# Patient Record
Sex: FEMALE | Race: WHITE | NOT HISPANIC OR LATINO | Employment: FULL TIME | URBAN - METROPOLITAN AREA
[De-identification: names, ages, dates, MRNs, and addresses within clinical notes are randomized per-mention and may not be internally consistent; named-entity substitution may affect disease eponyms.]

---

## 2018-03-20 ENCOUNTER — APPOINTMENT (OUTPATIENT)
Dept: CT IMAGING | Facility: HOSPITAL | Age: 28
End: 2018-03-20

## 2018-03-20 ENCOUNTER — HOSPITAL ENCOUNTER (EMERGENCY)
Facility: HOSPITAL | Age: 28
Discharge: HOME | End: 2018-03-20
Attending: FAMILY MEDICINE | Admitting: FAMILY MEDICINE

## 2018-03-20 VITALS
TEMPERATURE: 98.7 F | DIASTOLIC BLOOD PRESSURE: 67 MMHG | WEIGHT: 273 LBS | HEART RATE: 68 BPM | RESPIRATION RATE: 18 BRPM | HEIGHT: 70 IN | BODY MASS INDEX: 39.08 KG/M2 | OXYGEN SATURATION: 100 % | SYSTOLIC BLOOD PRESSURE: 118 MMHG

## 2018-03-20 DIAGNOSIS — R55 SYNCOPE AND COLLAPSE: Primary | ICD-10-CM

## 2018-03-20 LAB
ALBUMIN SERPL-MCNC: 4.2 G/DL (ref 3.5–5.2)
ALBUMIN/GLOB SERPL: 1.4 G/DL
ALP SERPL-CCNC: 59 U/L (ref 39–117)
ALT SERPL W P-5'-P-CCNC: 20 U/L (ref 1–33)
ANION GAP SERPL CALCULATED.3IONS-SCNC: 13.2 MMOL/L
AST SERPL-CCNC: 18 U/L (ref 1–32)
BASOPHILS # BLD AUTO: 0.03 10*3/MM3 (ref 0–0.2)
BASOPHILS NFR BLD AUTO: 0.2 % (ref 0–1.5)
BILIRUB SERPL-MCNC: 0.4 MG/DL (ref 0.1–1.2)
BILIRUB UR QL STRIP: NEGATIVE
BUN BLD-MCNC: 7 MG/DL (ref 6–20)
BUN/CREAT SERPL: 9.3 (ref 7–25)
CALCIUM SPEC-SCNC: 9.3 MG/DL (ref 8.6–10.5)
CHLORIDE SERPL-SCNC: 105 MMOL/L (ref 98–107)
CLARITY UR: ABNORMAL
CO2 SERPL-SCNC: 23.8 MMOL/L (ref 22–29)
COLOR UR: YELLOW
CREAT BLD-MCNC: 0.75 MG/DL (ref 0.57–1)
DEPRECATED RDW RBC AUTO: 44.3 FL (ref 37–54)
EOSINOPHIL # BLD AUTO: 0.18 10*3/MM3 (ref 0–0.7)
EOSINOPHIL NFR BLD AUTO: 1.5 % (ref 0.3–6.2)
ERYTHROCYTE [DISTWIDTH] IN BLOOD BY AUTOMATED COUNT: 13.4 % (ref 11.7–13)
GFR SERPL CREATININE-BSD FRML MDRD: 91 ML/MIN/1.73
GLOBULIN UR ELPH-MCNC: 2.9 GM/DL
GLUCOSE BLD-MCNC: 93 MG/DL (ref 65–99)
GLUCOSE UR STRIP-MCNC: NEGATIVE MG/DL
HCT VFR BLD AUTO: 42.8 % (ref 35.6–45.5)
HGB BLD-MCNC: 14.1 G/DL (ref 11.9–15.5)
HGB UR QL STRIP.AUTO: NEGATIVE
HOLD SPECIMEN: NORMAL
HOLD SPECIMEN: NORMAL
IMM GRANULOCYTES # BLD: 0.02 10*3/MM3 (ref 0–0.03)
IMM GRANULOCYTES NFR BLD: 0.2 % (ref 0–0.5)
KETONES UR QL STRIP: ABNORMAL
LEUKOCYTE ESTERASE UR QL STRIP.AUTO: NEGATIVE
LYMPHOCYTES # BLD AUTO: 1.93 10*3/MM3 (ref 0.9–4.8)
LYMPHOCYTES NFR BLD AUTO: 15.9 % (ref 19.6–45.3)
MCH RBC QN AUTO: 29.9 PG (ref 26.9–32)
MCHC RBC AUTO-ENTMCNC: 32.9 G/DL (ref 32.4–36.3)
MCV RBC AUTO: 90.7 FL (ref 80.5–98.2)
MONOCYTES # BLD AUTO: 0.97 10*3/MM3 (ref 0.2–1.2)
MONOCYTES NFR BLD AUTO: 8 % (ref 5–12)
NEUTROPHILS # BLD AUTO: 9.01 10*3/MM3 (ref 1.9–8.1)
NEUTROPHILS NFR BLD AUTO: 74.2 % (ref 42.7–76)
NITRITE UR QL STRIP: NEGATIVE
PH UR STRIP.AUTO: 6 [PH] (ref 5–8)
PLATELET # BLD AUTO: 188 10*3/MM3 (ref 140–500)
PMV BLD AUTO: 12.4 FL (ref 6–12)
POTASSIUM BLD-SCNC: 4 MMOL/L (ref 3.5–5.2)
PROT SERPL-MCNC: 7.1 G/DL (ref 6–8.5)
PROT UR QL STRIP: NEGATIVE
RBC # BLD AUTO: 4.72 10*6/MM3 (ref 3.9–5.2)
SODIUM BLD-SCNC: 142 MMOL/L (ref 136–145)
SP GR UR STRIP: 1.02 (ref 1–1.03)
UROBILINOGEN UR QL STRIP: ABNORMAL
WBC NRBC COR # BLD: 12.14 10*3/MM3 (ref 4.5–10.7)
WHOLE BLOOD HOLD SPECIMEN: NORMAL

## 2018-03-20 PROCEDURE — 80053 COMPREHEN METABOLIC PANEL: CPT | Performed by: FAMILY MEDICINE

## 2018-03-20 PROCEDURE — 93010 ELECTROCARDIOGRAM REPORT: CPT | Performed by: INTERNAL MEDICINE

## 2018-03-20 PROCEDURE — 85025 COMPLETE CBC W/AUTO DIFF WBC: CPT | Performed by: FAMILY MEDICINE

## 2018-03-20 PROCEDURE — 81003 URINALYSIS AUTO W/O SCOPE: CPT | Performed by: FAMILY MEDICINE

## 2018-03-20 PROCEDURE — 36415 COLL VENOUS BLD VENIPUNCTURE: CPT

## 2018-03-20 PROCEDURE — 99284 EMERGENCY DEPT VISIT MOD MDM: CPT

## 2018-03-20 PROCEDURE — 70450 CT HEAD/BRAIN W/O DYE: CPT

## 2018-03-20 PROCEDURE — 25010000002 ONDANSETRON PER 1 MG: Performed by: FAMILY MEDICINE

## 2018-03-20 PROCEDURE — 93005 ELECTROCARDIOGRAM TRACING: CPT | Performed by: FAMILY MEDICINE

## 2018-03-20 PROCEDURE — 93005 ELECTROCARDIOGRAM TRACING: CPT

## 2018-03-20 PROCEDURE — 96374 THER/PROPH/DIAG INJ IV PUSH: CPT

## 2018-03-20 RX ORDER — ESCITALOPRAM OXALATE 20 MG/1
5 TABLET ORAL
COMMUNITY
Start: 2015-08-26

## 2018-03-20 RX ORDER — SODIUM CHLORIDE 0.9 % (FLUSH) 0.9 %
10 SYRINGE (ML) INJECTION AS NEEDED
Status: DISCONTINUED | OUTPATIENT
Start: 2018-03-20 | End: 2018-03-20 | Stop reason: HOSPADM

## 2018-03-20 RX ORDER — NORETHINDRONE ACETATE AND ETHINYL ESTRADIOL 1; .02 MG/1; MG/1
1 TABLET ORAL
COMMUNITY
Start: 2015-08-26

## 2018-03-20 RX ORDER — METRONIDAZOLE 250 MG/1
250 TABLET ORAL 3 TIMES DAILY
COMMUNITY

## 2018-03-20 RX ORDER — ONDANSETRON 2 MG/ML
4 INJECTION INTRAMUSCULAR; INTRAVENOUS ONCE
Status: COMPLETED | OUTPATIENT
Start: 2018-03-20 | End: 2018-03-20

## 2018-03-20 RX ADMIN — SODIUM CHLORIDE 1000 ML: 9 INJECTION, SOLUTION INTRAVENOUS at 15:59

## 2018-03-20 RX ADMIN — ONDANSETRON 4 MG: 2 INJECTION INTRAMUSCULAR; INTRAVENOUS at 15:58

## 2018-03-20 NOTE — ED NOTES
Pt updated. Face appears calm and relaxed. Pt thanked for their patience.      Amber Dasilva RN  03/20/18 7979

## 2018-03-20 NOTE — ED PROVIDER NOTES
EMERGENCY DEPARTMENT ENCOUNTER    CHIEF COMPLAINT  Chief Complaint: Syncope  History given by: Pt  History limited by: Nothing  Room Number: 15/15  PMD: Janice Howell DO      HPI:  Pt is a 27 y.o. female, with history of panic attacks, who presents complaining of an unwitnessed syncopal episode that occurred earlier today.   Pt states that she was having a panic attack with SOB and nausea.  She went to the backroom to vomit. The next thing she remembers was waking up in the bathroom on the floor, with vomit on the toilet, glasses off of her face, and urine on herself.  She states that she was having a stressful phonecall with the phone company prior to the onset of her panic attack. Pt reports that she had one other  Syncopal episode when she was in high school.  Pt denies history of heart issues or a history of seizure.       Duration:  PTA  Onset: Sudden  Timing: Episodic  Intensity/Severity: Moderate  Progression: Improved  Associated Symptoms: Pt states that she was having a panic attack with SOB and nausea.    Aggravating Factors: Unknown   Alleviating Factors: None  Previous Episodes: None  Treatment before arrival: None    PAST MEDICAL HISTORY  Active Ambulatory Problems     Diagnosis Date Noted   • No Active Ambulatory Problems     Resolved Ambulatory Problems     Diagnosis Date Noted   • Pregnancy 2016     Past Medical History:   Diagnosis Date   • Abnormal Pap smear of cervix    • Anxiety    • Depression    • Gonorrhea    • HPV (human papilloma virus) infection        PAST SURGICAL HISTORY  Past Surgical History:   Procedure Laterality Date   •  SECTION     • NASAL SEPTUM SURGERY     • TONSILLECTOMY     • WISDOM TOOTH EXTRACTION         FAMILY HISTORY  History reviewed. No pertinent family history.    SOCIAL HISTORY  Social History     Social History   • Marital status:      Spouse name: N/A   • Number of children: N/A   • Years of education: N/A     Occupational History   • Not on  file.     Social History Main Topics   • Smoking status: Current Every Day Smoker     Packs/day: 1.00     Years: 14.00     Types: Cigarettes   • Smokeless tobacco: Not on file   • Alcohol use No   • Drug use:      Types: Marijuana      Comment: x 8 days ago.   • Sexual activity: Defer     Other Topics Concern   • Not on file     Social History Narrative   • No narrative on file       ALLERGIES  Review of patient's allergies indicates no known allergies.    REVIEW OF SYSTEMS  Review of Systems   Constitutional: Negative for fever.   HENT: Negative for sore throat.    Eyes: Negative.    Respiratory: Positive for shortness of breath. Negative for cough.    Cardiovascular: Negative for chest pain.   Gastrointestinal: Positive for nausea and vomiting. Negative for abdominal pain and diarrhea.   Genitourinary: Negative for dysuria.   Musculoskeletal: Negative for neck pain.   Skin: Negative for rash.   Allergic/Immunologic: Negative.    Neurological: Positive for syncope. Negative for weakness, numbness and headaches.   Hematological: Negative.    Psychiatric/Behavioral: Negative.    All other systems reviewed and are negative.      PHYSICAL EXAM  ED Triage Vitals   Temp Heart Rate Resp BP SpO2   03/20/18 1358 03/20/18 1358 03/20/18 1358 03/20/18 1410 03/20/18 1358   97.8 °F (36.6 °C) 109 18 154/100 100 %      Temp src Heart Rate Source Patient Position BP Location FiO2 (%)   03/20/18 1358 -- 03/20/18 1410 03/20/18 1410 --   Tympanic  Lying Right arm        Physical Exam   Constitutional: She is oriented to person, place, and time and well-developed, well-nourished, and in no distress. No distress.   HENT:   Head: Normocephalic.   There is a tender raised red indu on her forehead.  There is not laceration of the tongue.     Eyes: EOM are normal. Pupils are equal, round, and reactive to light.   Neck: Normal range of motion. Neck supple.   Cardiovascular: Normal rate, regular rhythm and normal heart sounds.     Pulmonary/Chest: Effort normal and breath sounds normal. No respiratory distress.   Abdominal: Soft. There is no tenderness. There is no rebound and no guarding.   Musculoskeletal: Normal range of motion. She exhibits no edema.   Neurological: She is alert and oriented to person, place, and time. She has normal sensation and normal strength.   Skin: Skin is warm and dry. No rash noted.   Psychiatric: Mood and affect normal.   Nursing note and vitals reviewed.      LAB RESULTS  Lab Results (last 24 hours)     Procedure Component Value Units Date/Time    Comprehensive Metabolic Panel [[id number]] Collected:  03/20/18 1431    Specimen:  Blood Updated:  03/20/18 1546     Glucose 93 mg/dL      BUN 7 mg/dL      Creatinine 0.75 mg/dL      Sodium 142 mmol/L      Potassium 4.0 mmol/L      Chloride 105 mmol/L      CO2 23.8 mmol/L      Calcium 9.3 mg/dL      Total Protein 7.1 g/dL      Albumin 4.20 g/dL      ALT (SGPT) 20 U/L      AST (SGOT) 18 U/L      Alkaline Phosphatase 59 U/L      Total Bilirubin 0.4 mg/dL      eGFR Non African Amer 91 mL/min/1.73      Globulin 2.9 gm/dL      A/G Ratio 1.4 g/dL      BUN/Creatinine Ratio 9.3     Anion Gap 13.2 mmol/L     CBC & Differential [[id number]] Collected:  03/20/18 1431    Specimen:  Blood Updated:  03/20/18 1543    Narrative:       The following orders were created for panel order CBC & Differential.  Procedure                               Abnormality         Status                     ---------                               -----------         ------                     CBC Auto Differential[[id number]]        Abnormal            Final result                 Please view results for these tests on the individual orders.    CBC Auto Differential [[id number]]  (Abnormal) Collected:  03/20/18 1431    Specimen:  Blood Updated:  03/20/18 1543     WBC 12.14 (H) 10*3/mm3      RBC 4.72 10*6/mm3      Hemoglobin 14.1 g/dL      Hematocrit 42.8 %      MCV 90.7 fL      MCH 29.9 pg      MCHC  32.9 g/dL      RDW 13.4 (H) %      RDW-SD 44.3 fl      MPV 12.4 (H) fL      Platelets 188 10*3/mm3      Neutrophil % 74.2 %      Lymphocyte % 15.9 (L) %      Monocyte % 8.0 %      Eosinophil % 1.5 %      Basophil % 0.2 %      Immature Grans % 0.2 %      Neutrophils, Absolute 9.01 (H) 10*3/mm3      Lymphocytes, Absolute 1.93 10*3/mm3      Monocytes, Absolute 0.97 10*3/mm3      Eosinophils, Absolute 0.18 10*3/mm3      Basophils, Absolute 0.03 10*3/mm3      Immature Grans, Absolute 0.02 10*3/mm3     Urinalysis With / Microscopic If Indicated - Urine, Clean Catch [[id number]]  (Abnormal) Collected:  03/20/18 1639    Specimen:  Urine from Urine, Clean Catch Updated:  03/20/18 1658     Color, UA Yellow     Appearance, UA Cloudy (A)     pH, UA 6.0     Specific Gravity, UA 1.020     Glucose, UA Negative     Ketones, UA 40 mg/dL (2+) (A)     Bilirubin, UA Negative     Blood, UA Negative     Protein, UA Negative     Leuk Esterase, UA Negative     Nitrite, UA Negative     Urobilinogen, UA 0.2 E.U./dL    Narrative:       Urine microscopic not indicated.          I ordered the above labs and reviewed the results    RADIOLOGY  CT Head Without Contrast   Preliminary Result   No acute process identified. See above. Further evaluation   could be performed with MRI examination of the brain.  The above   information was called to and discussed with Dr. Perez.       Radiation dose reduction techniques were utilized, including automated   exposure control and exposure modulation based on body size.                   I ordered the above noted radiological studies. Interpreted by radiologist.Reviewed by me in PACS.       PROCEDURES  Procedures    EKG           EKG time: 1423  Rhythm/Rate: Sinus, 75   P waves and AK: Normal  QRS, axis: Normal   ST and T waves: Normal     Interpreted Contemporaneously by me, independently viewed  There is no prior for comparison.    PROGRESS AND CONSULTS  ED Course     Medications   sodium chloride 0.9 %  flush 10 mL (not administered)   sodium chloride 0.9 % bolus 1,000 mL (0 mL Intravenous Stopped 3/20/18 1617)   ondansetron (ZOFRAN) injection 4 mg (4 mg Intravenous Given 3/20/18 1558)     1524   Ordered CT head without contrast and urinalysis for further evaluation.     1737  CT is negative. Labs are unremarkable.  Orthostatics are normal. In the context of panic attack, and profound nausea, this seems more like vasovagal syncope. Discussed diagnosis is plan to discharge. Pt understands and agrees with the plan, all questions answered.      MEDICAL DECISION MAKING  Results were reviewed/discussed with the patient and they were also made aware of online access. Pt also made aware that some labs, such as cultures, will not be resulted during ER visit and follow up with PMD is necessary.     MDM  Number of Diagnoses or Management Options  Syncope and collapse:      Amount and/or Complexity of Data Reviewed  Clinical lab tests: reviewed and ordered (Labs show nothing acute)  Tests in the radiology section of CPT®: reviewed and ordered (CT head shows nothing acute)  Tests in the medicine section of CPT®: reviewed and ordered (EKG is unremarkable)    Patient Progress  Patient progress: stable         DIAGNOSIS  Final diagnoses:   Syncope and collapse       DISPOSITION  DISCHARGE    Patient discharged in stable condition.    Reviewed implications of results, diagnosis, meds, responsibility to follow up, warning signs and symptoms of possible worsening, potential complications and reasons to return to ER.    Patient/Family voiced understanding of above instructions.    Discussed plan for discharge, as there is no emergent indication for admission. Patient referred to primary care provider for BP management due to today's BP. Pt/family is agreeable and understands need for follow up and repeat testing.  Pt is aware that discharge does not mean that nothing is wrong but it indicates no emergency is present that requires  admission and they must continue care with follow-up as given below or physician of their choice.     FOLLOW-UP  Janice Howell, DO  [address]  [place] [address] [phone]      For Follow up.  We'd like you to call for an appt to be seen in the next few days.         Medication List      No changes were made to your prescriptions during this visit.           Latest Documented Vital Signs:  As of 5:34 PM  BP- 123/70 HR- 70 Temp- 97.8 °F (36.6 °C) (Tympanic) O2 sat- 100%    --  Documentation assistance provided by ruperto Hart for Dr. Perez.  Information recorded by the scribe was done at my direction and has been verified and validated by me.       Sandrita Hart  03/20/18 1809       Oren Perez MD  03/21/18 0057

## 2018-08-05 ENCOUNTER — OFFICE VISIT (OUTPATIENT)
Dept: RETAIL CLINIC | Facility: CLINIC | Age: 28
End: 2018-08-05

## 2018-08-05 VITALS
RESPIRATION RATE: 18 BRPM | OXYGEN SATURATION: 98 % | HEART RATE: 78 BPM | DIASTOLIC BLOOD PRESSURE: 72 MMHG | SYSTOLIC BLOOD PRESSURE: 118 MMHG | TEMPERATURE: 98.3 F

## 2018-08-05 DIAGNOSIS — H66.009 ACUTE SUPPURATIVE OTITIS MEDIA WITHOUT SPONTANEOUS RUPTURE OF EAR DRUM, RECURRENCE NOT SPECIFIED, UNSPECIFIED LATERALITY: Primary | ICD-10-CM

## 2018-08-05 PROCEDURE — 99213 OFFICE O/P EST LOW 20 MIN: CPT | Performed by: NURSE PRACTITIONER

## 2018-08-05 RX ORDER — AZITHROMYCIN 500 MG/1
500 TABLET, FILM COATED ORAL DAILY
Qty: 5 TABLET | Refills: 0 | Status: SHIPPED | OUTPATIENT
Start: 2018-08-05

## 2018-08-05 RX ORDER — SULFAMETHOXAZOLE AND TRIMETHOPRIM 800; 160 MG/1; MG/1
1 TABLET ORAL 2 TIMES DAILY
COMMUNITY

## 2018-08-05 RX ORDER — BUPROPION HYDROCHLORIDE 150 MG/1
150 TABLET ORAL DAILY
COMMUNITY

## 2018-08-05 NOTE — PROGRESS NOTES
Subjective   Davida Wesley is a 28 y.o. female.     Cough   This is a new problem. The current episode started in the past 7 days. The problem has been gradually worsening. The problem occurs constantly. Associated symptoms include ear congestion, ear pain, nasal congestion and shortness of breath. Pertinent negatives include no fever or headaches. The symptoms are aggravated by stress (worse at night). Risk factors for lung disease include smoking/tobacco exposure. She has tried nothing for the symptoms. The treatment provided no relief. There is no history of asthma, bronchiectasis, emphysema or pneumonia.        The following portions of the patient's history were reviewed and updated as appropriate: allergies, current medications, past family history, past medical history, past social history, past surgical history and problem list.    Review of Systems   Constitutional: Negative for fever.   HENT: Positive for ear pain.    Respiratory: Positive for cough and shortness of breath.    Cardiovascular: Negative.    Gastrointestinal: Negative.    Genitourinary: Negative.    Neurological: Negative.        Objective   Physical Exam   Constitutional: She is cooperative. No distress.   HENT:   Head: Normocephalic.   Right Ear: Hearing, external ear and ear canal normal. There is tenderness. Tympanic membrane is erythematous and bulging.   Left Ear: Hearing, tympanic membrane, external ear and ear canal normal. There is tenderness.   Nose: Nose normal.   Mouth/Throat: Oropharynx is clear and moist.   Eyes: Pupils are equal, round, and reactive to light. Conjunctivae, EOM and lids are normal.   Neck: Trachea normal and full passive range of motion without pain.   Cardiovascular: Normal rate, regular rhythm and normal pulses.    Pulmonary/Chest: Effort normal. She has wheezes in the right upper field, the right middle field, the right lower field, the left upper field, the left middle field and the left lower field.    Lymphadenopathy:     She has cervical adenopathy.        Right cervical: Superficial cervical adenopathy present.        Left cervical: Superficial cervical adenopathy present.   Neurological: She is alert.   Skin: Skin is warm. Capillary refill takes less than 2 seconds.   Psychiatric: She has a normal mood and affect. Her speech is normal and behavior is normal.   Vitals reviewed.        Assessment/Plan   Davida was seen today for cough.    Diagnoses and all orders for this visit:    Acute suppurative otitis media without spontaneous rupture of ear drum, recurrence not specified, unspecified laterality    Other orders  -     azithromycin (ZITHROMAX) 500 MG tablet; Take 1 tablet by mouth Daily.  -     PredniSONE 5 MG tablet therapy pack dosepak; Take as directed on package

## 2018-08-05 NOTE — PATIENT INSTRUCTIONS
Acute Bronchitis, Adult  Acute bronchitis is when air tubes (bronchi) in the lungs suddenly get swollen. The condition can make it hard to breathe. It can also cause these symptoms:  · A cough.  · Coughing up clear, yellow, or green mucus.  · Wheezing.  · Chest congestion.  · Shortness of breath.  · A fever.  · Body aches.  · Chills.  · A sore throat.    Follow these instructions at home:  Medicines  · Take over-the-counter and prescription medicines only as told by your doctor.  · If you were prescribed an antibiotic medicine, take it as told by your doctor. Do not stop taking the antibiotic even if you start to feel better.  General instructions  · Rest.  · Drink enough fluids to keep your pee (urine) clear or pale yellow.  · Avoid smoking and secondhand smoke. If you smoke and you need help quitting, ask your doctor. Quitting will help your lungs heal faster.  · Use an inhaler, cool mist vaporizer, or humidifier as told by your doctor.  · Keep all follow-up visits as told by your doctor. This is important.  How is this prevented?  To lower your risk of getting this condition again:  · Wash your hands often with soap and water. If you cannot use soap and water, use hand .  · Avoid contact with people who have cold symptoms.  · Try not to touch your hands to your mouth, nose, or eyes.  · Make sure to get the flu shot every year.    Contact a doctor if:  · Your symptoms do not get better in 2 weeks.  Get help right away if:  · You cough up blood.  · You have chest pain.  · You have very bad shortness of breath.  · You become dehydrated.  · You faint (pass out) or keep feeling like you are going to pass out.  · You keep throwing up (vomiting).  · You have a very bad headache.  · Your fever or chills gets worse.  This information is not intended to replace advice given to you by your health care provider. Make sure you discuss any questions you have with your health care provider.  Document Released:  06/05/2009 Document Revised: 07/26/2017 Document Reviewed: 06/07/2017  [employer] Interactive Patient Education © 2018 [employer]    Ear Drainage  Ear drainage is discharge of ear wax, pus, blood, or other fluids from the ear.  Follow these instructions at home:  Pay attention to any changes in your ear drainage. Take these actions to help with your condition:  · Take over-the-counter and prescription medicines only as told by your health care provider.  · Do not use cotton-tipped swabs in your ear. Do not put any other objects in your ear.  · Do not swim until your health care provider has approved.  · Before you shower, cover a cotton ball with petroleum jelly and place that in your ear. This helps to keep water out.  · Avoid any exposure to smoke.  · Wash your hands before and after you touch your ears.  · Keep all follow-up visits as told by your health care provider. This is important.    Contact a health care provider if:  · You have increased drainage.  · You have ear pain.  · You have a fever.  · Your drainage is not getting better with treatment.  · Your ear drainage is bloody, white, clear, or yellow.  · Your ear is red or swollen.  Get help right away if:  · You have severe ear pain.  · You have a severe headache.  · You vomit.  · You feel dizzy.  · You have a seizure.  · You have new hearing loss.  This information is not intended to replace advice given to you by your health care provider. Make sure you discuss any questions you have with your health care provider.  Document Released: 12/18/2006 Document Revised: 05/25/2017 Document Reviewed: 03/22/2016  [employer] Interactive Patient Education © 2018 [employer]

## 2020-01-20 ENCOUNTER — OFFICE (AMBULATORY)
Dept: URBAN - METROPOLITAN AREA PATHOLOGY 4 | Facility: PATHOLOGY | Age: 30
End: 2020-01-20

## 2020-01-20 ENCOUNTER — ON CAMPUS - OUTPATIENT (AMBULATORY)
Dept: URBAN - METROPOLITAN AREA HOSPITAL 2 | Facility: HOSPITAL | Age: 30
End: 2020-01-20

## 2020-01-20 VITALS
RESPIRATION RATE: 18 BRPM | DIASTOLIC BLOOD PRESSURE: 48 MMHG | SYSTOLIC BLOOD PRESSURE: 93 MMHG | SYSTOLIC BLOOD PRESSURE: 120 MMHG | DIASTOLIC BLOOD PRESSURE: 93 MMHG | DIASTOLIC BLOOD PRESSURE: 78 MMHG | SYSTOLIC BLOOD PRESSURE: 96 MMHG | OXYGEN SATURATION: 99 % | HEIGHT: 71 IN | HEART RATE: 68 BPM | OXYGEN SATURATION: 98 % | TEMPERATURE: 98.2 F | DIASTOLIC BLOOD PRESSURE: 76 MMHG | HEART RATE: 62 BPM | HEART RATE: 67 BPM | DIASTOLIC BLOOD PRESSURE: 56 MMHG | OXYGEN SATURATION: 100 % | HEART RATE: 66 BPM | HEART RATE: 71 BPM | DIASTOLIC BLOOD PRESSURE: 58 MMHG | HEART RATE: 100 BPM | HEART RATE: 56 BPM | WEIGHT: 293 LBS | SYSTOLIC BLOOD PRESSURE: 98 MMHG | OXYGEN SATURATION: 96 % | DIASTOLIC BLOOD PRESSURE: 62 MMHG | OXYGEN SATURATION: 97 % | HEART RATE: 85 BPM | RESPIRATION RATE: 16 BRPM | SYSTOLIC BLOOD PRESSURE: 121 MMHG | SYSTOLIC BLOOD PRESSURE: 141 MMHG | SYSTOLIC BLOOD PRESSURE: 97 MMHG | SYSTOLIC BLOOD PRESSURE: 146 MMHG | DIASTOLIC BLOOD PRESSURE: 57 MMHG | HEART RATE: 89 BPM

## 2020-01-20 DIAGNOSIS — K62.5 HEMORRHAGE OF ANUS AND RECTUM: ICD-10-CM

## 2020-01-20 DIAGNOSIS — K64.8 OTHER HEMORRHOIDS: ICD-10-CM

## 2020-01-20 LAB
GI HISTOLOGY: A. UNSPECIFIED: (no result)
GI HISTOLOGY: PDF REPORT: (no result)

## 2020-01-20 PROCEDURE — 88305 TISSUE EXAM BY PATHOLOGIST: CPT | Performed by: INTERNAL MEDICINE

## 2020-01-20 PROCEDURE — 45380 COLONOSCOPY AND BIOPSY: CPT | Performed by: INTERNAL MEDICINE
